# Patient Record
Sex: FEMALE | Race: WHITE | NOT HISPANIC OR LATINO | Employment: OTHER | URBAN - METROPOLITAN AREA
[De-identification: names, ages, dates, MRNs, and addresses within clinical notes are randomized per-mention and may not be internally consistent; named-entity substitution may affect disease eponyms.]

---

## 2017-01-31 ENCOUNTER — ALLSCRIPTS OFFICE VISIT (OUTPATIENT)
Dept: OTHER | Facility: OTHER | Age: 62
End: 2017-01-31

## 2017-01-31 DIAGNOSIS — M81.0 AGE-RELATED OSTEOPOROSIS WITHOUT CURRENT PATHOLOGICAL FRACTURE: ICD-10-CM

## 2017-01-31 DIAGNOSIS — Z12.31 ENCOUNTER FOR SCREENING MAMMOGRAM FOR MALIGNANT NEOPLASM OF BREAST: ICD-10-CM

## 2017-01-31 PROCEDURE — G0145 SCR C/V CYTO,THINLAYER,RESCR: HCPCS | Performed by: OBSTETRICS & GYNECOLOGY

## 2017-02-01 ENCOUNTER — LAB REQUISITION (OUTPATIENT)
Dept: LAB | Facility: HOSPITAL | Age: 62
End: 2017-02-01
Payer: COMMERCIAL

## 2017-02-01 DIAGNOSIS — Z12.4 ENCOUNTER FOR SCREENING FOR MALIGNANT NEOPLASM OF CERVIX: ICD-10-CM

## 2017-02-03 LAB
LAB AP GYN PRIMARY INTERPRETATION: NORMAL
Lab: NORMAL

## 2017-03-02 ENCOUNTER — HOSPITAL ENCOUNTER (OUTPATIENT)
Dept: MAMMOGRAPHY | Facility: MEDICAL CENTER | Age: 62
Discharge: HOME | End: 2017-03-02
Payer: COMMERCIAL

## 2017-03-02 ENCOUNTER — HOSPITAL ENCOUNTER (OUTPATIENT)
Dept: BONE DENSITY | Facility: MEDICAL CENTER | Age: 62
Discharge: HOME | End: 2017-03-02
Payer: COMMERCIAL

## 2017-03-02 DIAGNOSIS — M81.0 AGE-RELATED OSTEOPOROSIS WITHOUT CURRENT PATHOLOGICAL FRACTURE: ICD-10-CM

## 2017-03-02 DIAGNOSIS — Z12.31 ENCOUNTER FOR SCREENING MAMMOGRAM FOR MALIGNANT NEOPLASM OF BREAST: ICD-10-CM

## 2017-03-02 PROCEDURE — G0202 SCR MAMMO BI INCL CAD: HCPCS

## 2017-03-02 PROCEDURE — 77080 DXA BONE DENSITY AXIAL: CPT

## 2017-03-13 ENCOUNTER — GENERIC CONVERSION - ENCOUNTER (OUTPATIENT)
Dept: OTHER | Facility: OTHER | Age: 62
End: 2017-03-13

## 2017-07-24 ENCOUNTER — APPOINTMENT (OUTPATIENT)
Dept: PHYSICAL THERAPY | Facility: CLINIC | Age: 62
End: 2017-07-24
Payer: COMMERCIAL

## 2017-07-24 PROCEDURE — G8991 OTHER PT/OT GOAL STATUS: HCPCS

## 2017-07-24 PROCEDURE — 97162 PT EVAL MOD COMPLEX 30 MIN: CPT

## 2017-07-24 PROCEDURE — G8990 OTHER PT/OT CURRENT STATUS: HCPCS

## 2017-07-26 ENCOUNTER — APPOINTMENT (OUTPATIENT)
Dept: PHYSICAL THERAPY | Facility: CLINIC | Age: 62
End: 2017-07-26
Payer: COMMERCIAL

## 2017-07-26 PROCEDURE — 97140 MANUAL THERAPY 1/> REGIONS: CPT

## 2017-07-26 PROCEDURE — 97110 THERAPEUTIC EXERCISES: CPT

## 2017-07-31 ENCOUNTER — APPOINTMENT (OUTPATIENT)
Dept: PHYSICAL THERAPY | Facility: CLINIC | Age: 62
End: 2017-07-31
Payer: COMMERCIAL

## 2017-07-31 PROCEDURE — 97110 THERAPEUTIC EXERCISES: CPT

## 2017-07-31 PROCEDURE — 97035 APP MDLTY 1+ULTRASOUND EA 15: CPT

## 2017-08-02 ENCOUNTER — APPOINTMENT (OUTPATIENT)
Dept: PHYSICAL THERAPY | Facility: CLINIC | Age: 62
End: 2017-08-02
Payer: COMMERCIAL

## 2017-08-02 PROCEDURE — 97110 THERAPEUTIC EXERCISES: CPT

## 2017-08-02 PROCEDURE — 97140 MANUAL THERAPY 1/> REGIONS: CPT

## 2017-08-02 PROCEDURE — 97035 APP MDLTY 1+ULTRASOUND EA 15: CPT

## 2017-08-03 ENCOUNTER — APPOINTMENT (OUTPATIENT)
Dept: PHYSICAL THERAPY | Facility: CLINIC | Age: 62
End: 2017-08-03
Payer: COMMERCIAL

## 2017-08-07 ENCOUNTER — APPOINTMENT (OUTPATIENT)
Dept: PHYSICAL THERAPY | Facility: CLINIC | Age: 62
End: 2017-08-07
Payer: COMMERCIAL

## 2017-08-10 ENCOUNTER — APPOINTMENT (OUTPATIENT)
Dept: PHYSICAL THERAPY | Facility: CLINIC | Age: 62
End: 2017-08-10
Payer: COMMERCIAL

## 2017-08-16 ENCOUNTER — APPOINTMENT (OUTPATIENT)
Dept: PHYSICAL THERAPY | Facility: CLINIC | Age: 62
End: 2017-08-16
Payer: COMMERCIAL

## 2017-08-17 ENCOUNTER — APPOINTMENT (OUTPATIENT)
Dept: PHYSICAL THERAPY | Facility: CLINIC | Age: 62
End: 2017-08-17
Payer: COMMERCIAL

## 2017-08-21 ENCOUNTER — APPOINTMENT (OUTPATIENT)
Dept: PHYSICAL THERAPY | Facility: CLINIC | Age: 62
End: 2017-08-21
Payer: COMMERCIAL

## 2017-08-24 ENCOUNTER — APPOINTMENT (OUTPATIENT)
Dept: PHYSICAL THERAPY | Facility: CLINIC | Age: 62
End: 2017-08-24
Payer: COMMERCIAL

## 2017-08-28 ENCOUNTER — APPOINTMENT (OUTPATIENT)
Dept: PHYSICAL THERAPY | Facility: CLINIC | Age: 62
End: 2017-08-28
Payer: COMMERCIAL

## 2017-08-31 ENCOUNTER — APPOINTMENT (OUTPATIENT)
Dept: PHYSICAL THERAPY | Facility: CLINIC | Age: 62
End: 2017-08-31
Payer: COMMERCIAL

## 2018-01-10 NOTE — MISCELLANEOUS
Message   Recorded as Task   Date: 03/10/2017 04:41 PM, Created By: Fermin Reis   Task Name: Go to Result   Assigned To: Alina Valentin   Regarding Patient: Yadira Souza, Status: Active   Comment:    Fermin Reis - 10 Mar 2017 4:41 PM     TASK CREATED  DEXA shows improvement, continue same - Fosamax, ca, vit d and exercise        Active Problems    1  Anemia (285 9) (D64 9)   2  Anxiety (300 00) (F41 9)   3  Backache (724 5) (M54 9)   4  Cervical cancer screening (V76 2) (Z12 4)   5  Chronic pain syndrome (338 4) (G89 4)   6  Depression (311) (F32 9)   7  Disturbance Of Coordination (781 99)   8  Encounter for routine gynecological examination (V72 31) (Z01 419)   9  Encounter for screening mammogram for malignant neoplasm of breast (V76 12)   (Z12 31)   10  Fatigue (780 79) (R53 83)   11  Follow-up examination following surgery (V67 00) (Z09)   12  Increased appetite (783 6) (R63 2)   13  Limb pain (729 5) (M79 609)   14  Limb Weakness (Paresis) (728 87)   15  Osteoporosis (733 00) (M81 0)   16  Visit for routine gyn exam (V72 31) (Z01 419)    Current Meds   1  Atorvastatin Calcium 20 MG Oral Tablet; Therapy: (Recorded:31Jan2017) to Recorded   2  Cholestyramine 4 GM Oral Packet; Therapy: (Recorded:31Jan2017) to Recorded   3  Citracal TABS; Therapy: (Recorded:09Sep2013) to Recorded   4  Fluticasone Propionate 50 MCG/ACT Nasal Suspension; Therapy: (Recorded:31Jan2017) to Recorded   5  Furosemide 20 MG Oral Tablet; Therapy: (Recorded:31Jan2017) to Recorded   6  Latanoprost 0 005 % Ophthalmic Solution; Therapy: (Recorded:31Jan2017) to Recorded   7  Pantoprazole Sodium 40 MG Oral Tablet Delayed Release; Therapy: (Recorded:31Jan2017) to Recorded   8  QUEtiapine Fumarate 100 MG Oral Tablet; Therapy: (Recorded:31Jan2017) to Recorded   9  ROPINIRole HCl - 0 25 MG Oral Tablet; Therapy: (Recorded:31Jan2017) to Recorded   10   Ventolin  (90 Base) MCG/ACT Inhalation Aerosol Solution; Therapy: (Recorded:31Jan2017) to Recorded   11  Zoloft 100 MG Oral Tablet (Sertraline HCl); Therapy: (Recorded:31Jan2017) to Recorded    Allergies    1   Sulfa Drugs    Signatures   Electronically signed by : Lucita Callahan, ; Mar 13 2017  9:17AM EST                       (Author)

## 2018-01-12 VITALS
SYSTOLIC BLOOD PRESSURE: 122 MMHG | HEIGHT: 60 IN | BODY MASS INDEX: 29.86 KG/M2 | DIASTOLIC BLOOD PRESSURE: 86 MMHG | WEIGHT: 152.13 LBS

## 2018-01-17 NOTE — MISCELLANEOUS
Message     Date: 02 Dec 2016 3:37 PM EST, Recorded By: Sheliah People For: Randal Oliveira   Caller: Roxy Barnes, Self   Phone: [phone] (Home)   Reason: Other   New patient called concerned about getting a mammo slip  Advise she will be given an order when she comes in for her appointment in January  Active Problems    1  Anemia (285 9) (D64 9)   2  Anxiety (300 00) (F41 9)   3  Backache (724 5) (M54 9)   4  Chronic pain syndrome (338 4) (G89 4)   5  Depression (311) (F32 9)   6  Disturbance Of Coordination (781 99)   7  Encounter for routine gynecological examination (V72 31) (Z01 419)   8  Encounter for screening mammogram for malignant neoplasm of breast (V76 12)   (Z12 31)   9  Fatigue (780 79) (R53 83)   10  Follow-up examination following surgery (V67 00) (Z09)   11  Increased appetite (783 6) (R63 2)   12  Limb pain (729 5) (M79 609)   13  Limb Weakness (Paresis) (728 87)   14  Osteoporosis (733 00) (M81 0)   15  Routine Gynecological Exam With Cervical Pap Smear (V72 31)    Current Meds   1  Ayr Saline Nasal Neti Rinse 1 57 GM Nasal Packet; Therapy: (Recorded:61Gcg5277) to Recorded   2  Citracal TABS; Therapy: (Recorded:03Ejj4156) to Recorded   3  ClonazePAM 1 MG Oral Tablet; TAKE 1 TABLET 3 TIMES DAILY; Therapy: (Recorded:90Waw4439) to Recorded   4  Effexor  MG Oral Capsule Extended Release 24 Hour (Venlafaxine HCl ER); Therapy: (Recorded:02Yyu2143) to Recorded   5  Fexofenadine HCl - 180 MG Oral Tablet; TAKE 1 TABLET DAILY; Therapy: ([id number]) to Recorded   6  Lisinopril-Hydrochlorothiazide 10-12 5 MG Oral Tablet; Therapy: (Bryan Steinberg) to Recorded   7  Omeprazole 20 MG Oral Capsule Delayed Release; Therapy: 29Xdc8118 to (Last Priyank Layman)  Requested for: 58Ylo2488 Ordered   8  SEROquel 400 MG Oral Tablet (QUEtiapine Fumarate); Therapy: 72Eni7403 to (Last Rx:67Qnj3297)  Requested for: 43Hiw9674 Ordered   9   SEROquel XR 50 MG Oral Tablet Extended Release 24 Hour; Therapy: ([id number]) to Recorded   10  Simvastatin 40 MG Oral Tablet; Therapy: 97Tio4922 to (Last Yumi Head)  Requested for: 44Wfl3207 Ordered   11  Singulair 10 MG Oral Tablet (Montelukast Sodium); Therapy: 63ALD3175 to (Last Rx:17Aug2011)  Requested for: 17Aug2011 Ordered   12  TriLyte 420 GM Oral Solution Reconstituted; TAKE AS DIRECTED; Therapy: 04KAH4622 to (Evaluate:09Jan2016)  Requested for: 85IGG9698; Last    Rx:08Jan2016 Ordered    Allergies    1   Sulfa Drugs    Signatures   Electronically signed by : Artie Ngo, ; Dec  2 2016  3:39PM EST                       (Author)

## 2018-04-13 ENCOUNTER — ANNUAL EXAM (OUTPATIENT)
Dept: OBGYN CLINIC | Facility: CLINIC | Age: 63
End: 2018-04-13
Payer: COMMERCIAL

## 2018-04-13 VITALS
WEIGHT: 161 LBS | BODY MASS INDEX: 30.4 KG/M2 | SYSTOLIC BLOOD PRESSURE: 136 MMHG | HEIGHT: 61 IN | DIASTOLIC BLOOD PRESSURE: 84 MMHG

## 2018-04-13 DIAGNOSIS — Z01.419 ENCOUNTER FOR ANNUAL ROUTINE GYNECOLOGICAL EXAMINATION: Primary | ICD-10-CM

## 2018-04-13 DIAGNOSIS — Z12.31 ENCOUNTER FOR SCREENING MAMMOGRAM FOR BREAST CANCER: ICD-10-CM

## 2018-04-13 PROCEDURE — 99396 PREV VISIT EST AGE 40-64: CPT | Performed by: OBSTETRICS & GYNECOLOGY

## 2018-04-13 RX ORDER — LANOLIN ALCOHOL/MO/W.PET/CERES
1 CREAM (GRAM) TOPICAL 2 TIMES DAILY
COMMUNITY

## 2018-04-13 RX ORDER — MELATONIN
1000 DAILY
COMMUNITY

## 2018-04-13 RX ORDER — CLONAZEPAM 0.5 MG/1
0.5 TABLET ORAL 2 TIMES DAILY
COMMUNITY

## 2018-04-13 RX ORDER — ATORVASTATIN CALCIUM 20 MG/1
20 TABLET, FILM COATED ORAL DAILY
COMMUNITY

## 2018-04-13 RX ORDER — LISINOPRIL 10 MG/1
10 TABLET ORAL 2 TIMES DAILY
COMMUNITY

## 2018-04-13 RX ORDER — FLUTICASONE PROPIONATE 50 MCG
1 SPRAY, SUSPENSION (ML) NASAL DAILY
COMMUNITY

## 2018-04-13 RX ORDER — PNV NO.95/FERROUS FUM/FOLIC AC 28MG-0.8MG
1 TABLET ORAL DAILY
COMMUNITY

## 2018-04-13 RX ORDER — ROPINIROLE 0.25 MG/1
0.5 TABLET, FILM COATED ORAL 2 TIMES DAILY
COMMUNITY

## 2018-04-13 RX ORDER — PANTOPRAZOLE SODIUM 40 MG/1
40 TABLET, DELAYED RELEASE ORAL 2 TIMES DAILY
COMMUNITY

## 2018-04-13 RX ORDER — CHOLESTYRAMINE 4 G/9G
1 POWDER, FOR SUSPENSION ORAL
COMMUNITY

## 2018-04-13 NOTE — PROGRESS NOTES
Assessment/Plan:    No problem-specific Assessment & Plan notes found for this encounter  Diagnoses and all orders for this visit:    Encounter for annual routine gynecological examination    Encounter for screening mammogram for breast cancer    Other orders  -     Albuterol Sulfate 108 (90 Base) MCG/ACT AEPB; Inhale as needed  -     atorvastatin (LIPITOR) 20 mg tablet; Take 20 mg by mouth daily  -     calcium citrate-vitamin D (CITRACAL+D) 315-200 MG-UNIT per tablet; Take 1 tablet by mouth 2 (two) times a day  -     cholestyramine (QUESTRAN) 4 g packet; Take 1 packet by mouth 3 (three) times a day with meals  -     ciclopirox (PENLAC) 8 % solution; Apply 1 application topically daily at bedtime  -     clonazePAM (KlonoPIN) 0 5 mg tablet; Take 0 5 mg by mouth 2 (two) times a day  -     Omega-3 Fatty Acids (FISH OIL OMEGA-3) 1000 MG CAPS; Take 1 capsule by mouth daily  -     fluticasone (FLONASE) 50 mcg/act nasal spray; 1 spray into each nostril daily  -     lisinopril (ZESTRIL) 10 mg tablet; Take 10 mg by mouth 2 (two) times a day  -     cholecalciferol (VITAMIN D3) 1,000 units tablet; Take 1,000 Units by mouth daily  -     rOPINIRole (REQUIP) 0 25 mg tablet; Take 0 5 mg by mouth 2 (two) times a day  -     pantoprazole (PROTONIX) 40 mg tablet; Take 40 mg by mouth 2 (two) times a day          Subjective:      Patient ID: Krunal Lopez is a 57 y o  female      HPI    The following portions of the patient's history were reviewed and updated as appropriate: allergies, current medications, past family history, past medical history, past social history, past surgical history and problem list     Review of Systems      Objective:      /84 (BP Location: Right arm, Patient Position: Sitting, Cuff Size: Adult)   Ht 5' 1" (1 549 m)   Wt 73 kg (161 lb)   BMI 30 42 kg/m²          Physical Exam

## 2018-04-13 NOTE — PROGRESS NOTES
Assessment/Plan:    Mammogram reviewed with her    Pap up to date    Osteopenia - I reviewed her DEXA with her in detail  She is not very mobile but I reviewed walking, lift ing light weights, Ca, vit D  I urged her to do as much as she is able  No problem-specific Assessment & Plan notes found for this encounter  Diagnoses and all orders for this visit:    Encounter for annual routine gynecological examination    Encounter for screening mammogram for breast cancer    Other orders  -     Albuterol Sulfate 108 (90 Base) MCG/ACT AEPB; Inhale as needed  -     atorvastatin (LIPITOR) 20 mg tablet; Take 20 mg by mouth daily  -     calcium citrate-vitamin D (CITRACAL+D) 315-200 MG-UNIT per tablet; Take 1 tablet by mouth 2 (two) times a day  -     cholestyramine (QUESTRAN) 4 g packet; Take 1 packet by mouth 3 (three) times a day with meals  -     ciclopirox (PENLAC) 8 % solution; Apply 1 application topically daily at bedtime  -     clonazePAM (KlonoPIN) 0 5 mg tablet; Take 0 5 mg by mouth 2 (two) times a day  -     Omega-3 Fatty Acids (FISH OIL OMEGA-3) 1000 MG CAPS; Take 1 capsule by mouth daily  -     fluticasone (FLONASE) 50 mcg/act nasal spray; 1 spray into each nostril daily  -     lisinopril (ZESTRIL) 10 mg tablet; Take 10 mg by mouth 2 (two) times a day  -     cholecalciferol (VITAMIN D3) 1,000 units tablet; Take 1,000 Units by mouth daily  -     rOPINIRole (REQUIP) 0 25 mg tablet; Take 0 5 mg by mouth 2 (two) times a day  -     pantoprazole (PROTONIX) 40 mg tablet; Take 40 mg by mouth 2 (two) times a day          Subjective:      Patient ID: Nereida Gee is a 57 y o  female  Patient here for yearly, she has no gyn complaints  She has multiple medical issues  Last year she had a DEXA that showed osteopenia at all 3 sites, this was an improvement from her osteoporosis  She had been on  Fosamax but stopped it over a year ago  She is not very active    She had a normal mammogram   Normal pap last year        The following portions of the patient's history were reviewed and updated as appropriate: allergies, current medications, past family history, past medical history, past social history, past surgical history and problem list     Review of Systems   All other systems reviewed and are negative  Objective:      /84 (BP Location: Right arm, Patient Position: Sitting, Cuff Size: Adult)   Ht 5' 1" (1 549 m)   Wt 73 kg (161 lb)   BMI 30 42 kg/m²          Physical Exam   Constitutional: She appears well-developed  Neck: No tracheal deviation present  No thyromegaly present  Cardiovascular: Normal rate and regular rhythm  Pulmonary/Chest: Effort normal and breath sounds normal  Right breast exhibits no inverted nipple, no mass, no nipple discharge, no skin change and no tenderness  Left breast exhibits no inverted nipple, no mass, no nipple discharge, no skin change and no tenderness  Breasts are symmetrical    Examined seated and supine   Abdominal: Soft  She exhibits no distension and no mass  There is no tenderness  Genitourinary: Rectum normal, vagina normal and uterus normal  No labial fusion  There is no rash, tenderness, lesion or injury on the right labia  There is no rash, tenderness, lesion or injury on the left labia  Cervix exhibits no motion tenderness, no discharge and no friability  Right adnexum displays no mass, no tenderness and no fullness  Left adnexum displays no mass, no tenderness and no fullness  Vitals reviewed

## 2019-01-17 ENCOUNTER — TELEPHONE (OUTPATIENT)
Dept: OBGYN CLINIC | Facility: CLINIC | Age: 64
End: 2019-01-17

## 2019-01-17 DIAGNOSIS — Z12.31 VISIT FOR SCREENING MAMMOGRAM: Primary | ICD-10-CM

## 2019-01-31 ENCOUNTER — TELEPHONE (OUTPATIENT)
Dept: OBGYN CLINIC | Facility: CLINIC | Age: 64
End: 2019-01-31

## 2019-05-08 DIAGNOSIS — Z12.31 VISIT FOR SCREENING MAMMOGRAM: ICD-10-CM

## 2019-07-01 ENCOUNTER — TELEPHONE (OUTPATIENT)
Dept: UROLOGY | Facility: AMBULATORY SURGERY CENTER | Age: 64
End: 2019-07-01

## 2019-07-01 NOTE — TELEPHONE ENCOUNTER
Pt had left message on call center mailbox to make appt  She lives in [facility]  Was in the ER     Was in the ER and was advised to f/u with Urology  I called pt back and she just got in from her PCP  Asked to call her back because she needs to get settled and eat      Please call to make appt #[phone]

## 2020-07-09 ENCOUNTER — ANNUAL EXAM (OUTPATIENT)
Dept: OBGYN CLINIC | Facility: CLINIC | Age: 65
End: 2020-07-09
Payer: COMMERCIAL

## 2020-07-09 VITALS
BODY MASS INDEX: 30.65 KG/M2 | TEMPERATURE: 97.8 F | SYSTOLIC BLOOD PRESSURE: 130 MMHG | DIASTOLIC BLOOD PRESSURE: 78 MMHG | WEIGHT: 162.2 LBS

## 2020-07-09 DIAGNOSIS — Z01.419 ENCOUNTER FOR ANNUAL ROUTINE GYNECOLOGICAL EXAMINATION: Primary | ICD-10-CM

## 2020-07-09 DIAGNOSIS — Z78.0 ASYMPTOMATIC MENOPAUSE: ICD-10-CM

## 2020-07-09 DIAGNOSIS — Z12.31 ENCOUNTER FOR SCREENING MAMMOGRAM FOR BREAST CANCER: ICD-10-CM

## 2020-07-09 PROCEDURE — G0101 CA SCREEN;PELVIC/BREAST EXAM: HCPCS | Performed by: OBSTETRICS & GYNECOLOGY

## 2020-07-09 NOTE — PROGRESS NOTES
Assessment/Plan:    pap is up to date -reviewed guidelines    mammogram reviewed with her including breast density  RX given for next year  Discussed self breast exams    colon cancer screening - she has a hemoccult every year  She just had one a few months ago    Aydin Harris 96 ordered, discussed calcium, vitamin-D and exercise  discussed preventive care, regular exercise and a healthy diet      No problem-specific Assessment & Plan notes found for this encounter  Diagnoses and all orders for this visit:    Encounter for annual routine gynecological examination    Encounter for screening mammogram for breast cancer  -     Mammo screening bilateral w 3d & cad; Future    Asymptomatic menopause  -     DXA bone density spine hip and pelvis; Future          Subjective:      Patient ID: Rahul Dueñas is a 75 y o  female  Pt here for yearly  She has no gyn complaints  Normal Pap in 2017  Normal 3D mammogram in June of this year showed scattered fibroglandular densities  DEXA from 2017 showed osteopenia in all 3 sites which is an improvement from her osteoporosis  She was on Fosamax but is no longer on this  The following portions of the patient's history were reviewed and updated as appropriate: allergies, current medications, past family history, past medical history, past social history, past surgical history and problem list     Review of Systems   Constitutional: Negative  Gastrointestinal: Negative  Genitourinary: Negative  Objective: There were no vitals taken for this visit  Physical Exam   Constitutional: She appears well-developed  Neck: No tracheal deviation present  No thyromegaly present  Cardiovascular: Normal rate and regular rhythm  Pulmonary/Chest: Effort normal and breath sounds normal  Right breast exhibits no inverted nipple, no mass, no nipple discharge, no skin change and no tenderness   Left breast exhibits no inverted nipple, no mass, no nipple discharge, no skin change and no tenderness  Breasts are symmetrical    Examined seated and supine   Abdominal: Soft  She exhibits no distension and no mass  There is no tenderness  Genitourinary: Rectum normal, vagina normal and uterus normal  No labial fusion  There is no rash, tenderness, lesion or injury on the right labia  There is no rash, tenderness, lesion or injury on the left labia  Cervix exhibits no motion tenderness, no discharge and no friability  Right adnexum displays no mass, no tenderness and no fullness  Left adnexum displays no mass, no tenderness and no fullness  Vitals reviewed

## 2020-07-15 ENCOUNTER — HOSPITAL ENCOUNTER (OUTPATIENT)
Dept: BONE DENSITY | Facility: MEDICAL CENTER | Age: 65
Discharge: HOME | End: 2020-07-15
Payer: COMMERCIAL

## 2020-07-15 DIAGNOSIS — Z78.0 ASYMPTOMATIC MENOPAUSE: ICD-10-CM

## 2020-07-15 PROCEDURE — 77080 DXA BONE DENSITY AXIAL: CPT

## 2020-09-21 ENCOUNTER — TRANSCRIBE ORDERS (OUTPATIENT)
Dept: NEUROSURGERY | Facility: CLINIC | Age: 65
End: 2020-09-21

## 2020-09-21 DIAGNOSIS — G89.4 CHRONIC PAIN SYNDROME: Primary | ICD-10-CM

## 2022-07-15 ENCOUNTER — ANNUAL EXAM (OUTPATIENT)
Dept: OBGYN CLINIC | Facility: CLINIC | Age: 67
End: 2022-07-15
Payer: MEDICARE

## 2022-07-15 VITALS
WEIGHT: 163.6 LBS | BODY MASS INDEX: 34.34 KG/M2 | DIASTOLIC BLOOD PRESSURE: 78 MMHG | SYSTOLIC BLOOD PRESSURE: 136 MMHG | HEIGHT: 58 IN

## 2022-07-15 DIAGNOSIS — Z12.31 ENCOUNTER FOR SCREENING MAMMOGRAM FOR BREAST CANCER: ICD-10-CM

## 2022-07-15 DIAGNOSIS — M81.0 OSTEOPOROSIS OF FEMUR WITHOUT PATHOLOGICAL FRACTURE: ICD-10-CM

## 2022-07-15 DIAGNOSIS — Z01.419 ENCOUNTER FOR ANNUAL ROUTINE GYNECOLOGICAL EXAMINATION: Primary | ICD-10-CM

## 2022-07-15 PROCEDURE — G0101 CA SCREEN;PELVIC/BREAST EXAM: HCPCS | Performed by: OBSTETRICS & GYNECOLOGY

## 2022-07-15 RX ORDER — SERTRALINE HYDROCHLORIDE 100 MG/1
200 TABLET, FILM COATED ORAL EVERY MORNING
COMMUNITY
Start: 2022-07-06

## 2022-07-15 RX ORDER — LOSARTAN POTASSIUM 25 MG/1
TABLET ORAL
COMMUNITY
Start: 2022-03-09

## 2022-07-15 RX ORDER — MONTELUKAST SODIUM 10 MG/1
10 TABLET ORAL DAILY
COMMUNITY
Start: 2022-04-27

## 2022-07-15 RX ORDER — AZELASTINE HCL 205.5 UG/1
SPRAY NASAL
COMMUNITY
Start: 2022-04-04

## 2022-07-15 RX ORDER — OMEPRAZOLE 40 MG/1
40 CAPSULE, DELAYED RELEASE ORAL 2 TIMES DAILY
COMMUNITY
Start: 2022-07-11

## 2022-07-15 RX ORDER — FERROUS SULFATE 325(65) MG
TABLET ORAL
COMMUNITY
Start: 2022-06-15

## 2022-07-15 RX ORDER — QUETIAPINE FUMARATE 100 MG/1
TABLET, FILM COATED ORAL
COMMUNITY
Start: 2022-06-14

## 2022-07-15 RX ORDER — TIZANIDINE 2 MG/1
TABLET ORAL
COMMUNITY
Start: 2022-03-21

## 2022-07-15 NOTE — LETTER
July 15, 2022     Darrelyn Canavan, DO  [address] [facility]  [address]  [place] [address]    Patient: Araceli Gudino   YOB: 1955   Date of Visit: 7/15/2022       Dear Dr Allen Gottron:    I saw Kirk Karimi today for her yearly gyn exam   Below are my notes for this consultation  If you have questions, please do not hesitate to call me  I look forward to following your patient along with you           Sincerely,        Mikhail Parisi DO        CC:

## 2022-07-15 NOTE — PROGRESS NOTES
Assessment/Plan:    She does not require a pap     mammogram reviewed with her including breast density  RX given for next April    Discussed self breast exams    colon cancer screening - colonoscopy done in 2016, now she does yearly hemoccult    Osteoporosis - DEXA ordered, discussed recommendations, she is not very active because of her mobility issues  She stated that she lives alone and does not have much help     discussed preventive care, regular exercise and a healthy diet      No problem-specific Assessment & Plan notes found for this encounter  Diagnoses and all orders for this visit:    Encounter for annual routine gynecological examination    Encounter for screening mammogram for breast cancer  -     Mammo screening bilateral w 3d & cad; Future    Osteoporosis of femur without pathological fracture  -     DXA bone density spine hip and pelvis; Future    Other orders  -     Azelastine HCl 0 15 % SOLN; azelastine 205 5 mcg (0 15 %) nasal spray   instill 1 spray into each nostril twice a day  -     Diclofenac Sodium (VOLTAREN) 1 %; diclofenac 1 % topical gel   APPLY 2 GRAMS TOPICALLY 4 TIMES A DAY TO AFFECTED AREA  -     FeroSul 325 (65 Fe) MG tablet; TAKE 1 TABLET 2 TIMES A DAY WITH MEALS  -     losartan (COZAAR) 25 mg tablet; losartan 25 mg tablet   take 1 tablet by mouth once daily  -     montelukast (SINGULAIR) 10 mg tablet; Take 10 mg by mouth daily  -     omeprazole (PriLOSEC) 40 MG capsule; Take 40 mg by mouth 2 (two) times a day  -     sertraline (ZOLOFT) 100 mg tablet; Take 200 mg by mouth every morning  -     tiZANidine (ZANAFLEX) 2 mg tablet; tizanidine 2 mg tablet   TAKE 1 TABLET EVERY 8 HOURS AS NEEDED FOR MUSCLE SPASMS  -     QUEtiapine (SEROquel) 100 mg tablet; TAKE 2 TABLETS BY MOUTH NIGHTLY  TAKE DOSE AT BEDTIME ONLY  Subjective:      Patient ID: Aura Medina is a 74 y o  female  Patient here for yearly  She has no gyn complaints    She seems somewhat forgetful at times although when asked questions, she answered very appropriately and had the answers to my questions  She has issues with mobility and uses a morphine pump for chronic pain    She had a normal 3D mammogram in April with scattered fibroglandular densities  She had osteoporosis in her femoral neck on DEXA from 2020 and osteopenia at all other sites  She has not started treatment  We      The following portions of the patient's history were reviewed and updated as appropriate: allergies, current medications, past family history, past medical history, past social history, past surgical history and problem list     Review of Systems   Constitutional: Negative  Gastrointestinal: Negative  Genitourinary: Negative  Objective:      /78 (BP Location: Left arm, Patient Position: Sitting)   Ht 4' 9 75" (1 467 m)   Wt 74 2 kg (163 lb 9 6 oz)   BMI 34 49 kg/m²          Physical Exam  Vitals reviewed  Constitutional:       Appearance: She is well-developed  Neck:      Thyroid: No thyromegaly  Trachea: No tracheal deviation  Cardiovascular:      Rate and Rhythm: Normal rate and regular rhythm  Pulmonary:      Effort: Pulmonary effort is normal       Breath sounds: Normal breath sounds  Chest:   Breasts: Breasts are symmetrical       Right: No inverted nipple, mass, nipple discharge, skin change or tenderness  Left: No inverted nipple, mass, nipple discharge, skin change or tenderness  Abdominal:      General: There is no distension  Palpations: Abdomen is soft  There is no mass  Tenderness: There is no abdominal tenderness  Genitourinary:     Labia:         Right: No rash, tenderness, lesion or injury  Left: No rash, tenderness, lesion or injury  Vagina: Normal       Cervix: No cervical motion tenderness, discharge or friability  Adnexa:         Right: No mass, tenderness or fullness  Left: No mass, tenderness or fullness  Rectum: Normal

## 2022-07-15 NOTE — LETTER
July 15, 2022     Isabell Alex DO  [address] [facility]  [address]  [place] [address] [facility] [address]    Patient: Lulu Saldaña   YOB: 1955   Date of Visit: 7/15/2022       Dear Dr Josafat Agarwal:    I saw Loan Crowder today for yearly gyn exam  Below are my notes for this consultation  If you have questions, please do not hesitate to call me  I look forward to following your patient along with you           Sincerely,        Bud Cheek DO

## 2022-09-07 ENCOUNTER — TELEPHONE (OUTPATIENT)
Dept: GYNECOLOGY | Facility: CLINIC | Age: 67
End: 2022-09-07

## 2023-01-09 ENCOUNTER — HOSPITAL ENCOUNTER (OUTPATIENT)
Dept: BONE DENSITY | Facility: MEDICAL CENTER | Age: 68
Discharge: HOME | End: 2023-01-09

## 2023-01-09 DIAGNOSIS — M81.0 OSTEOPOROSIS OF FEMUR WITHOUT PATHOLOGICAL FRACTURE: ICD-10-CM

## 2023-01-18 DIAGNOSIS — M81.0 AGE RELATED OSTEOPOROSIS, UNSPECIFIED PATHOLOGICAL FRACTURE PRESENCE: Primary | ICD-10-CM

## 2023-01-26 ENCOUNTER — TELEPHONE (OUTPATIENT)
Dept: GYNECOLOGY | Facility: CLINIC | Age: 68
End: 2023-01-26

## 2023-01-26 NOTE — TELEPHONE ENCOUNTER
Patient called  She has an appointment on Friday 1/27/23 to discuss Dexa results  She received blood work orders in the mail but was confused about the papers and what it was for  She did not go for the blood work as ordered by Dr Judson Tamayo  Advised patient to keep appointment anyway

## 2023-01-27 ENCOUNTER — CONSULT (OUTPATIENT)
Dept: GYNECOLOGY | Facility: CLINIC | Age: 68
End: 2023-01-27

## 2023-01-27 VITALS
BODY MASS INDEX: 33.88 KG/M2 | SYSTOLIC BLOOD PRESSURE: 116 MMHG | HEIGHT: 58 IN | WEIGHT: 161.4 LBS | DIASTOLIC BLOOD PRESSURE: 80 MMHG

## 2023-01-27 DIAGNOSIS — M81.0 AGE RELATED OSTEOPOROSIS, UNSPECIFIED PATHOLOGICAL FRACTURE PRESENCE: Primary | ICD-10-CM

## 2023-01-27 RX ORDER — ONDANSETRON HYDROCHLORIDE 8 MG/1
TABLET, FILM COATED ORAL
COMMUNITY
Start: 2023-01-04

## 2023-01-27 NOTE — LETTER
January 27, 2023     Yoselin [facility], [address] [facility] [address] [place] [address]  [address]  [facility] [address]    Patient: Deejay Kumari   YOB: 1955   Date of Visit: 1/27/2023       Dear Dr Mary Crawford:    I saw Kevin Alaniz today to review her DEXA  She has worsening osteoporosis  We have discussed this in the past but she has not be treated  I reviewed treatment with her today in detail  I think that because of her GI issues, she would do better with an injectable medication  We do not give that in the office  We discussed referring her to endocrinology  Below are my notes for this consultation  If you have questions, please do not hesitate to call me  I look forward to following your patient along with you           Sincerely,        Rd Sprague, DO show

## 2023-01-27 NOTE — PROGRESS NOTES
Assessment/Plan:     Osteoporosis -I reviewed the DEXA with her  Her osteoporosis has worsened since her DEXA from 2020  We discussed calcium and vitamin D  We also discussed exercise  She does not get out much but we discussed muscle strengthening exercises that she could do at home  She does have resistance bands  We discussed medications  Initially, we talked about oral medication such as Fosamax however she states that she has frequent issues with reflux and nausea and has frequent vomiting if her stomach becomes upset  In light of this, would avoid an oral bisphosphonate although may be she would tolerate Atelvia  She is probably a good candidate for Prolia  We do not give this in our office but it could be given through her primary care doctor possibly or we could refer her to endocrinology  She is unsure of what she should do  I gave her information on all of the medications that we discussed and urged her to read them over  I did order an intact PTH, CMP and vitamin D level  She is going to see her internist in about 2 weeks  I will send him these visit notes  There are no diagnoses linked to this encounter  Subjective:     Patient ID: Jeremias Rios is a 69 y o  female  Patient here to discuss her DEXA results  DEXA from earlier this month showed osteoporosis in her total hip and femoral neck, normal lumbar spine  I ordered a CMP, vitamin D level and intact PTH  She had osteoporosis of the femoral neck on her DEXA from 2020  We had discussed treatment at her last visit but she has not started any medication  She has limited mobility due to back problems and chronic pain  She does take calcium and vitamin D    She has problems with reflux and nausea and vomits at times due to this  Review of Systems   Constitutional: Negative  Gastrointestinal: Negative  Genitourinary: Negative            Objective:     Physical Exam

## 2023-01-30 ENCOUNTER — TELEPHONE (OUTPATIENT)
Dept: GYNECOLOGY | Facility: CLINIC | Age: 68
End: 2023-01-30

## 2023-01-30 NOTE — TELEPHONE ENCOUNTER
Faxed notes to Dr Kamille Torres office  Called to check on Prolia  Per his nurse, Dr Spencer Barraza does not give Prolia injections in his office but will discuss options at patient's upcoming office visit

## 2023-01-30 NOTE — TELEPHONE ENCOUNTER
----- Message from Jesus Contreras DO sent at 1/27/2023  4:05 PM EST -----  Hello,    Can you please send a copy of my notes from this visit to Dr Alejandra Baer  I am not sure it will go through [facility] because he is not in the network  Also, can you see if they give Prolia there? That is probably best for her as she has multiple stomach issues and she seems unsure about going to endocrinology    If you need me to talk to them or refer her to endocrine, please let me know  thanks

## 2023-01-31 ENCOUNTER — TELEPHONE (OUTPATIENT)
Dept: GYNECOLOGY | Facility: CLINIC | Age: 68
End: 2023-01-31

## 2023-01-31 NOTE — TELEPHONE ENCOUNTER
Per Dr Carmela Aguirre, called patient to inform of blood work results  Advised discuss with Dr Severino Robledo at her upcoming appointment  Patient will also discuss her Dexa Scan result at her appointment with him  Faxed results to Dr Cruz Babinski office  Scanned blood work results into patient's Epic chart

## 2023-11-30 ENCOUNTER — TELEPHONE (OUTPATIENT)
Dept: NEUROSURGERY | Facility: CLINIC | Age: 68
End: 2023-11-30

## 2023-12-11 ENCOUNTER — CONSULT (OUTPATIENT)
Dept: NEUROSURGERY | Facility: CLINIC | Age: 68
End: 2023-12-11
Payer: MEDICARE

## 2023-12-11 VITALS
TEMPERATURE: 97.9 F | DIASTOLIC BLOOD PRESSURE: 108 MMHG | OXYGEN SATURATION: 96 % | HEIGHT: 57 IN | BODY MASS INDEX: 34.73 KG/M2 | WEIGHT: 161 LBS | SYSTOLIC BLOOD PRESSURE: 161 MMHG | HEART RATE: 93 BPM

## 2023-12-11 DIAGNOSIS — Z45.42 BATTERY END OF LIFE OF SPINAL CORD STIMULATOR: Primary | ICD-10-CM

## 2023-12-11 DIAGNOSIS — R10.2 VAGINAL PAIN: ICD-10-CM

## 2023-12-11 DIAGNOSIS — Z01.818 PRE-PROCEDURAL EXAMINATION: ICD-10-CM

## 2023-12-11 PROCEDURE — 99204 OFFICE O/P NEW MOD 45 MIN: CPT | Performed by: STUDENT IN AN ORGANIZED HEALTH CARE EDUCATION/TRAINING PROGRAM

## 2023-12-11 RX ORDER — DULOXETIN HYDROCHLORIDE 30 MG/1
30 CAPSULE, DELAYED RELEASE ORAL DAILY
COMMUNITY

## 2023-12-11 RX ORDER — MULTIVITAMIN
1 CAPSULE ORAL DAILY
COMMUNITY

## 2023-12-11 RX ORDER — TORSEMIDE 20 MG/1
20 TABLET ORAL DAILY
COMMUNITY
Start: 2023-12-06

## 2023-12-11 RX ORDER — QUETIAPINE FUMARATE 25 MG/1
25 TABLET, FILM COATED ORAL 2 TIMES DAILY
COMMUNITY
Start: 2023-12-01

## 2023-12-11 RX ORDER — ACETAMINOPHEN 325 MG/1
650 TABLET ORAL EVERY 6 HOURS PRN
COMMUNITY

## 2023-12-11 RX ORDER — OSTOMY SUPPLY
BOX MISCELLANEOUS
COMMUNITY

## 2023-12-11 RX ORDER — LIDOCAINE 4 G/G
PATCH TOPICAL
COMMUNITY

## 2023-12-11 RX ORDER — CHLORHEXIDINE GLUCONATE ORAL RINSE 1.2 MG/ML
15 SOLUTION DENTAL ONCE
OUTPATIENT
Start: 2023-12-11 | End: 2023-12-11

## 2023-12-11 RX ORDER — BUSPIRONE HYDROCHLORIDE 15 MG/1
15 TABLET ORAL 2 TIMES DAILY
COMMUNITY
Start: 2023-12-01 | End: 2023-12-11 | Stop reason: ALTCHOICE

## 2023-12-11 RX ORDER — GABAPENTIN 300 MG/1
300 CAPSULE ORAL 3 TIMES DAILY
COMMUNITY
Start: 2023-12-06

## 2023-12-11 RX ORDER — CARBOXYMETHYLCELLULOSE SODIUM 5 MG/ML
1 SOLUTION/ DROPS OPHTHALMIC 4 TIMES DAILY
COMMUNITY

## 2023-12-11 RX ORDER — ACETAMINOPHEN 500 MG
500 TABLET ORAL EVERY 6 HOURS PRN
COMMUNITY

## 2023-12-11 RX ORDER — MIRTAZAPINE 7.5 MG/1
7.5 TABLET, FILM COATED ORAL DAILY
COMMUNITY

## 2023-12-11 RX ORDER — LOSARTAN POTASSIUM 50 MG/1
50 TABLET ORAL DAILY
COMMUNITY
Start: 2023-12-03

## 2023-12-11 RX ORDER — TRAMADOL HYDROCHLORIDE 50 MG/1
50 TABLET ORAL EVERY 8 HOURS PRN
COMMUNITY
Start: 2023-12-06

## 2023-12-11 NOTE — LETTER
December 11, 2023     Yoanna Harp DO  [address]  [address]  [facility] [address]    Patient: Eric Barraza   YOB: 1955   Date of Visit: 12/11/2023       Dear Dr. Kya Chung: Thank you for referring Patrick Montano to me for evaluation. Below are my notes for this consultation. If you have questions, please do not hesitate to call me. I look forward to following your patient along with you. Sincerely,        Carlos Steward MD        CC: No Recipients    Carlos Steward MD  12/11/2023 11:39 AM  Sign when Signing Visit  Assessment/Plan:  75-year-old female with a history of posterior lumbar decompression surgery many years ago and postlaminectomy syndrome treated with a spinal cord stimulator that was placed around 2012 and a subsequent morphine intrathecal pain pump placed around 2015. She presents to clinic today for discussion of battery end-of-life care for her spinal cord stimulator. I discussed risk and benefits of left flank spinal cord stimulator battery replacement. She and her POA agreed to proceed with surgery and consent was obtained. Will need preoperative clearance prior to surgery. She also complains of a history of vaginal pain for several weeks and I ordered a UA to rule out UTI for this. I also ordered x-ray thoracic and x-ray lumbar spine for baseline films of her hardware. Subjective:      Patient ID: Eric Barraza is a 77 y.o. female. HPI    75-year-old female with a history of posterior lumbar decompression surgery many years ago and postlaminectomy syndrome treated with a spinal cord stimulator that was placed around 2012 and a subsequent morphine intrathecal pain pump placed around 2015. She presents to clinic today for discussion of battery end-of-life care for her spinal cord stimulator. She states that over the last year or so she has noticed worsening pain in her low back and left lower extremity in the lateral thigh and lateral leg.   She states that she has had good relief from her spinal cord stimulator in the past however has not had it evaluated in quite some time and is unsure when the battery has . She states that she had her pain pump evaluated approximately 2 to 3 months ago and it was in normal working order.     The following portions of the patient's history were reviewed and updated as appropriate: allergies, current medications, past family history, past medical history, past social history, past surgical history, and problem list.    Review of Systems      Objective:      BP (!) 161/108 (BP Location: Left arm, Patient Position: Sitting, Cuff Size: Standard)   Pulse 93   Temp 97.9 °F (36.6 °C) (Temporal)   Ht 4' 9" (1.448 m)   Wt 73 kg (161 lb)   SpO2 96%   BMI 34.84 kg/m²          Physical Exam    A&OX3  Gaze conjugate  EOMI  FS  TM  Fcx4  5/5 BUE  5/5 BLE  SILT

## 2023-12-11 NOTE — PROGRESS NOTES
Assessment/Plan:  22-year-old female with a history of posterior lumbar decompression surgery many years ago and postlaminectomy syndrome treated with a spinal cord stimulator that was placed around  and a subsequent morphine intrathecal pain pump placed around . She presents to clinic today for discussion of battery end-of-life care for her spinal cord stimulator. She states that she has had good relief of her low back pain and leg pain with her spinal cord stimulator in the past.  I discussed risk and benefits of left flank spinal cord stimulator battery replacement. She and her POA agreed to proceed with surgery and consent was obtained. Will need preoperative clearance prior to surgery. She also complains of a history of vaginal pain for several weeks and I ordered a UA to rule out UTI for this. I also ordered x-ray thoracic and x-ray lumbar spine for baseline films of her hardware. Subjective:      Patient ID: Sherie Zaidi is a 77 y.o. female. HPI    22-year-old female with a history of posterior lumbar decompression surgery many years ago and postlaminectomy syndrome treated with a spinal cord stimulator that was placed around  and a subsequent morphine intrathecal pain pump placed around . She presents to clinic today for discussion of battery end-of-life care for her spinal cord stimulator. She states that over the last year or so she has noticed worsening pain in her low back and left lower extremity in the lateral thigh and lateral leg. She states that she has had good relief from her spinal cord stimulator in the past however has not had it evaluated in quite some time and is unsure when the battery has . She states that she had her pain pump evaluated approximately 2 to 3 months ago and it was in normal working order.     The following portions of the patient's history were reviewed and updated as appropriate: allergies, current medications, past family history, past medical history, past social history, past surgical history, and problem list.    Review of Systems      Objective:      BP (!) 161/108 (BP Location: Left arm, Patient Position: Sitting, Cuff Size: Standard)   Pulse 93   Temp 97.9 °F (36.6 °C) (Temporal)   Ht 4' 9" (1.448 m)   Wt 73 kg (161 lb)   SpO2 96%   BMI 34.84 kg/m²          Physical Exam    A&OX3  Gaze conjugate  EOMI  FS  TM  Fcx4  5/5 BUE  5/5 BLE  SILT

## 2023-12-12 ENCOUNTER — TELEPHONE (OUTPATIENT)
Dept: GYNECOLOGY | Facility: CLINIC | Age: 68
End: 2023-12-12

## 2023-12-12 NOTE — TELEPHONE ENCOUNTER
[facility] called to schedule appointment for patient. Her last Yearly exam was on 7/15/22. No answer to return call. No voicemail.

## 2023-12-27 ENCOUNTER — TELEPHONE (OUTPATIENT)
Dept: NEUROSURGERY | Facility: CLINIC | Age: 68
End: 2023-12-27

## 2023-12-27 ENCOUNTER — HOSPITAL ENCOUNTER (OUTPATIENT)
Dept: RADIOLOGY | Facility: HOSPITAL | Age: 68
Discharge: HOME | End: 2023-12-27
Payer: MEDICARE

## 2023-12-27 DIAGNOSIS — Z45.42 BATTERY END OF LIFE OF SPINAL CORD STIMULATOR: ICD-10-CM

## 2023-12-27 PROCEDURE — 72070 X-RAY EXAM THORAC SPINE 2VWS: CPT

## 2023-12-27 PROCEDURE — 72100 X-RAY EXAM L-S SPINE 2/3 VWS: CPT

## 2023-12-27 NOTE — TELEPHONE ENCOUNTER
Spoke with Lu of [facility] @ 3:39 pm, stated will have patient labs drawn and will fax results by Friday, 12/29.

## 2023-12-28 ENCOUNTER — TELEPHONE (OUTPATIENT)
Dept: NEUROSURGERY | Facility: CLINIC | Age: 68
End: 2023-12-28

## 2023-12-28 RX ORDER — BISACODYL 10 MG
10 SUPPOSITORY, RECTAL RECTAL AS NEEDED
COMMUNITY

## 2023-12-28 RX ORDER — BUSPIRONE HYDROCHLORIDE 15 MG/1
15 TABLET ORAL 2 TIMES DAILY
COMMUNITY

## 2023-12-28 RX ORDER — CIPROFLOXACIN 250 MG/1
250 TABLET, FILM COATED ORAL EVERY 12 HOURS SCHEDULED
COMMUNITY

## 2023-12-28 RX ORDER — MULTIVIT WITH MINERALS/LUTEIN
1000 TABLET ORAL DAILY
COMMUNITY

## 2023-12-28 NOTE — TELEPHONE ENCOUNTER
Called and left message with Joceline, nurse manager for [facility] after calling and my call was transferred and I was then either hung up on or the call was dropped.     I was calling to confirm the patients labs were drawn today and the EKG was completed, as I was advised by Su, the nurse I spoke with yesterday. She advised me they would be done today since this was still pending and was to be done on Wednesday, 12/27 at the time she completed her X-Rays at [facility] and for some reason they were completed.     I spoke with Lu on 12/27 at 3:39 pm as well as Su to confirm the exact labs needed and the EKG needed and confirmed surgery date of 1/2/24.

## 2023-12-28 NOTE — PRE-PROCEDURE INSTRUCTIONS
Pre-Surgery Instructions:   Medication Instructions    acetaminophen (TYLENOL) 325 mg tablet Uses PRN- OK to take day of surgery    acetaminophen (TYLENOL) 500 mg tablet Uses PRN- OK to take day of surgery    Ascorbic Acid (vitamin C) 1000 MG tablet Hold day of surgery.    bisacodyl (Dulcolax) 10 mg suppository Uses PRN- DO NOT take day of surgery    bisacodyl (FLEET) 10 MG/30ML ENEM Uses PRN- DO NOT take day of surgery    busPIRone (BUSPAR) 15 mg tablet Take day of surgery.    carboxymethylcellulose (REFRESH PLUS) 0.5 % SOLN Take day of surgery.    cholecalciferol (VITAMIN D3) 1,000 units tablet Hold day of surgery.    ciprofloxacin (CIPRO) 250 mg tablet Hold day of surgery. Only the am dose    clonazePAM (KlonoPIN) 0.5 mg tablet Take day of surgery     cyanocobalamin (VITAMIN B-12) 500 MCG tablet Hold day of surgery.    Diclofenac Sodium (VOLTAREN) 1 % Stop taking 3 days prior to surgery.    DULoxetine (CYMBALTA) 30 mg delayed release capsule Take day of surgery.    gabapentin (NEURONTIN) 300 mg capsule Take day of surgery.    latanoprost (XALATAN) 0.005 % ophthalmic solution Take night before surgery    losartan (COZAAR) 50 mg tablet Hold day of surgery. ( If taken in pm ok to take the night before surgery    magnesium hydroxide (MILK OF MAGNESIA) 400 mg/5 mL oral suspension Uses PRN- DO NOT take day of surgery    mirtazapine (REMERON) 7.5 MG tablet Take night before surgery    Multiple Vitamin (multivitamin) capsule Hold day of surgery.    Ostomy Supplies (Skin Prep Wipes) [facility] [place] Please send extra supplies with pt the Day of surgery    QUEtiapine (SEROquel) 25 mg tablet Take day of surgery.    torsemide (DEMADEX) 20 mg tablet Hold day of surgery.    traMADol (ULTRAM) 50 mg tablet Uses PRN- OK to take day of surgery   Medication instructions for day surgery reviewed. Please use only a sip of water to take your instructed medications. Avoid all over the counter vitamins, supplements and NSAIDS for one week prior to  surgery per anesthesia guidelines. Tylenol is ok to take as needed.     You will receive a call one business day prior to surgery with an arrival time and hospital directions. If your surgery is scheduled on a Monday, the hospital will be calling you on the Friday prior to your surgery. If you have not heard from anyone by 8pm, please call the hospital supervisor through the hospital  at [phone]. ([place] [phone]).    Do not eat or drink anything after midnight the night before your surgery, including candy, mints, lifesavers, or chewing gum. Do not drink alcohol 24hrs before your surgery. Try not to smoke at least 24hrs before your surgery.       Follow the pre surgery showering instructions as listed in the “My Surgical Experience Booklet” or otherwise provided by your surgeon's office. Do not use a blade to shave the surgical area 1 week before surgery. It is okay to use a clean electric clippers up to 24 hours before surgery. Do not apply any lotions, creams, including makeup, cologne, deodorant, or perfumes after showering on the day of your surgery. Do not use dry shampoo, hair spray, hair gel, or any type of hair products.     No contact lenses, eye make-up, or artificial eyelashes. Remove nail polish, including gel polish, and any artificial, gel, or acrylic nails if possible. Remove all jewelry including rings and body piercing jewelry.     Wear causal clothing that is easy to take on and off. Consider your type of surgery.    Keep any valuables, jewelry, piercings at home. Please bring any specially ordered equipment (sling, braces) if indicated.    Arrange for a responsible person to drive you to and from the hospital on the day of your surgery. Visitor Guidelines discussed.     Call the surgeon's office with any new illnesses, exposures, or additional questions prior to surgery.    Please reference your “My Surgical Experience Booklet” for additional information to prepare for your  upcoming surgery.    Male

## 2023-12-29 ENCOUNTER — ANESTHESIA EVENT (OUTPATIENT)
Dept: PERIOP | Facility: HOSPITAL | Age: 68
End: 2023-12-29
Payer: MEDICARE

## 2024-01-02 ENCOUNTER — ANESTHESIA (OUTPATIENT)
Dept: PERIOP | Facility: HOSPITAL | Age: 69
End: 2024-01-02
Payer: MEDICARE

## 2024-01-02 ENCOUNTER — HOSPITAL ENCOUNTER (OUTPATIENT)
Facility: HOSPITAL | Age: 69
Setting detail: OUTPATIENT SURGERY
Discharge: HOME | End: 2024-01-02
Attending: STUDENT IN AN ORGANIZED HEALTH CARE EDUCATION/TRAINING PROGRAM | Admitting: STUDENT IN AN ORGANIZED HEALTH CARE EDUCATION/TRAINING PROGRAM
Payer: MEDICARE

## 2024-01-02 VITALS
HEIGHT: 61 IN | RESPIRATION RATE: 27 BRPM | TEMPERATURE: 98.1 F | SYSTOLIC BLOOD PRESSURE: 177 MMHG | DIASTOLIC BLOOD PRESSURE: 79 MMHG | HEART RATE: 90 BPM | OXYGEN SATURATION: 96 % | WEIGHT: 154.32 LBS | BODY MASS INDEX: 29.14 KG/M2

## 2024-01-02 DIAGNOSIS — Z98.890 POSTOPERATIVE STATE: Primary | ICD-10-CM

## 2024-01-02 LAB — GLUCOSE SERPL-MCNC: 103 MG/DL (ref 65–140)

## 2024-01-02 PROCEDURE — 82948 REAGENT STRIP/BLOOD GLUCOSE: CPT

## 2024-01-02 PROCEDURE — 63685 INS/RPLC SPI NPG/RCVR POCKET: CPT | Performed by: STUDENT IN AN ORGANIZED HEALTH CARE EDUCATION/TRAINING PROGRAM

## 2024-01-02 PROCEDURE — C1820 GENERATOR NEURO RECHG BAT SY: HCPCS | Performed by: STUDENT IN AN ORGANIZED HEALTH CARE EDUCATION/TRAINING PROGRAM

## 2024-01-02 DEVICE — IMPLANTABLE PULSE GENERATOR KIT
Type: IMPLANTABLE DEVICE | Site: SACRUM | Status: FUNCTIONAL
Brand: WAVEWRITER ALPHA™ PRIME

## 2024-01-02 RX ORDER — FENTANYL CITRATE/PF 50 MCG/ML
25 SYRINGE (ML) INJECTION
Status: ACTIVE | OUTPATIENT
Start: 2024-01-02 | End: 2024-01-02

## 2024-01-02 RX ORDER — BUPIVACAINE HYDROCHLORIDE 5 MG/ML
INJECTION, SOLUTION EPIDURAL; INTRACAUDAL AS NEEDED
Status: DISCONTINUED | OUTPATIENT
Start: 2024-01-02 | End: 2024-01-02 | Stop reason: HOSPADM

## 2024-01-02 RX ORDER — SUCCINYLCHOLINE/SOD CL,ISO/PF 100 MG/5ML
SYRINGE (ML) INTRAVENOUS AS NEEDED
Status: DISCONTINUED | OUTPATIENT
Start: 2024-01-02 | End: 2024-01-02

## 2024-01-02 RX ORDER — ALBUTEROL SULFATE 2.5 MG/3ML
2.5 SOLUTION RESPIRATORY (INHALATION) ONCE AS NEEDED
Status: DISCONTINUED | OUTPATIENT
Start: 2024-01-02 | End: 2024-01-02 | Stop reason: HOSPADM

## 2024-01-02 RX ORDER — VANCOMYCIN HYDROCHLORIDE 1 G/20ML
INJECTION, POWDER, LYOPHILIZED, FOR SOLUTION INTRAVENOUS AS NEEDED
Status: DISCONTINUED | OUTPATIENT
Start: 2024-01-02 | End: 2024-01-02 | Stop reason: HOSPADM

## 2024-01-02 RX ORDER — PHENYLEPHRINE HYDROCHLORIDE 10 MG/ML
INJECTION INTRAVENOUS AS NEEDED
Status: DISCONTINUED | OUTPATIENT
Start: 2024-01-02 | End: 2024-01-02

## 2024-01-02 RX ORDER — CHLORHEXIDINE GLUCONATE ORAL RINSE 1.2 MG/ML
15 SOLUTION DENTAL ONCE
Status: DISCONTINUED | OUTPATIENT
Start: 2024-01-02 | End: 2024-01-02 | Stop reason: HOSPADM

## 2024-01-02 RX ORDER — HYDROMORPHONE HCL IN WATER/PF 6 MG/30 ML
0.2 PATIENT CONTROLLED ANALGESIA SYRINGE INTRAVENOUS
Status: ACTIVE | OUTPATIENT
Start: 2024-01-02 | End: 2024-01-02

## 2024-01-02 RX ORDER — CEFAZOLIN SODIUM 2 G/50ML
SOLUTION INTRAVENOUS AS NEEDED
Status: DISCONTINUED | OUTPATIENT
Start: 2024-01-02 | End: 2024-01-02

## 2024-01-02 RX ORDER — SODIUM CHLORIDE, SODIUM LACTATE, POTASSIUM CHLORIDE, CALCIUM CHLORIDE 600; 310; 30; 20 MG/100ML; MG/100ML; MG/100ML; MG/100ML
125 INJECTION, SOLUTION INTRAVENOUS CONTINUOUS
Status: DISCONTINUED | OUTPATIENT
Start: 2024-01-02 | End: 2024-01-02 | Stop reason: HOSPADM

## 2024-01-02 RX ORDER — METOCLOPRAMIDE HYDROCHLORIDE 5 MG/ML
10 INJECTION INTRAMUSCULAR; INTRAVENOUS ONCE AS NEEDED
Status: DISCONTINUED | OUTPATIENT
Start: 2024-01-02 | End: 2024-01-02 | Stop reason: HOSPADM

## 2024-01-02 RX ORDER — ONDANSETRON 2 MG/ML
INJECTION INTRAMUSCULAR; INTRAVENOUS AS NEEDED
Status: DISCONTINUED | OUTPATIENT
Start: 2024-01-02 | End: 2024-01-02

## 2024-01-02 RX ORDER — CEPHALEXIN 500 MG/1
500 CAPSULE ORAL EVERY 6 HOURS SCHEDULED
Qty: 28 CAPSULE | Refills: 0 | Status: SHIPPED | OUTPATIENT
Start: 2024-01-02 | End: 2024-01-09

## 2024-01-02 RX ORDER — FENTANYL CITRATE 50 UG/ML
INJECTION, SOLUTION INTRAMUSCULAR; INTRAVENOUS AS NEEDED
Status: DISCONTINUED | OUTPATIENT
Start: 2024-01-02 | End: 2024-01-02

## 2024-01-02 RX ORDER — ONDANSETRON 2 MG/ML
4 INJECTION INTRAMUSCULAR; INTRAVENOUS ONCE AS NEEDED
Status: DISCONTINUED | OUTPATIENT
Start: 2024-01-02 | End: 2024-01-02 | Stop reason: HOSPADM

## 2024-01-02 RX ORDER — LIDOCAINE HYDROCHLORIDE 10 MG/ML
INJECTION, SOLUTION EPIDURAL; INFILTRATION; INTRACAUDAL; PERINEURAL AS NEEDED
Status: DISCONTINUED | OUTPATIENT
Start: 2024-01-02 | End: 2024-01-02

## 2024-01-02 RX ORDER — OXYCODONE HYDROCHLORIDE AND ACETAMINOPHEN 5; 325 MG/1; MG/1
1 TABLET ORAL EVERY 4 HOURS PRN
Qty: 20 TABLET | Refills: 0 | Status: SHIPPED | OUTPATIENT
Start: 2024-01-02 | End: 2024-01-09

## 2024-01-02 RX ORDER — PROPOFOL 10 MG/ML
INJECTION, EMULSION INTRAVENOUS AS NEEDED
Status: DISCONTINUED | OUTPATIENT
Start: 2024-01-02 | End: 2024-01-02

## 2024-01-02 RX ORDER — LIDOCAINE HYDROCHLORIDE 10 MG/ML
0.5 INJECTION, SOLUTION EPIDURAL; INFILTRATION; INTRACAUDAL; PERINEURAL ONCE AS NEEDED
Status: DISCONTINUED | OUTPATIENT
Start: 2024-01-02 | End: 2024-01-02 | Stop reason: HOSPADM

## 2024-01-02 RX ADMIN — Medication 1 MG: at 14:52

## 2024-01-02 RX ADMIN — LIDOCAINE HYDROCHLORIDE 50 MG: 10 INJECTION, SOLUTION EPIDURAL; INFILTRATION; INTRACAUDAL; PERINEURAL at 14:52

## 2024-01-02 RX ADMIN — ONDANSETRON 4 MG: 2 INJECTION INTRAMUSCULAR; INTRAVENOUS at 15:13

## 2024-01-02 RX ADMIN — Medication 100 MG: at 14:53

## 2024-01-02 RX ADMIN — PHENYLEPHRINE HYDROCHLORIDE 100 MCG: 10 INJECTION INTRAVENOUS at 15:31

## 2024-01-02 RX ADMIN — PROPOFOL 100 MG: 10 INJECTION, EMULSION INTRAVENOUS at 14:52

## 2024-01-02 RX ADMIN — FENTANYL CITRATE 50 MCG: 50 INJECTION, SOLUTION INTRAMUSCULAR; INTRAVENOUS at 14:46

## 2024-01-02 RX ADMIN — DEXMEDETOMIDINE 4 MCG: 100 INJECTION, SOLUTION, CONCENTRATE INTRAVENOUS at 14:47

## 2024-01-02 RX ADMIN — CEFAZOLIN SODIUM 2000 MG: 2 SOLUTION INTRAVENOUS at 15:02

## 2024-01-02 RX ADMIN — PHENYLEPHRINE HYDROCHLORIDE 100 MCG: 10 INJECTION INTRAVENOUS at 15:14

## 2024-01-02 RX ADMIN — FENTANYL CITRATE 50 MCG: 50 INJECTION, SOLUTION INTRAMUSCULAR; INTRAVENOUS at 15:01

## 2024-01-02 RX ADMIN — PHENYLEPHRINE HYDROCHLORIDE 100 MCG: 10 INJECTION INTRAVENOUS at 15:23

## 2024-01-02 RX ADMIN — SODIUM CHLORIDE, SODIUM LACTATE, POTASSIUM CHLORIDE, AND CALCIUM CHLORIDE: .6; .31; .03; .02 INJECTION, SOLUTION INTRAVENOUS at 14:40

## 2024-01-02 NOTE — DISCHARGE INSTR - AVS FIRST PAGE
Spine Day Surgery Discharge Instructions    Activity:    1. Do not lift more than 20 pounds for 2 weeks.    Surgical incision care:    1. Keep dressing in place for 3 days.  2. Keep incision dry for 3 days.  3. May allow clean water to flow over incision after 3 days.  4. Do not immerse the incision in water for 4 weeks.  5. After 3 days, incision may be left open to air, but should remain clean.  6. Do not apply any creams or ointments to the incision, unless otherwise instructed by [facility].  7. Contact office if increasing redness, drainage, pain or swelling around the incision.    Postoperative medication:    1. [facility] will provide pain medication for the first 2 weeks after surgery as coordinated with your pain specialist.   2. If [facility] is providing pain medication, please contact office for questions regarding dosage and modifications.  3. If [facility] is not providing pain medication postoperatively, then contact pain specialist for additional instructions and prescriptions.  4. Resume antiplatelet/anticoagulation medication 2 days after surgery, unless you notice new neurological symptoms or bleeding from incision.  5. Do not operate heavy machinery or vehicles while taking sedating medications.

## 2024-01-02 NOTE — ANESTHESIA PREPROCEDURE EVALUATION
Procedure:  REPLACEMENT IMPLANTABLE PULSE GENERATOR DORSAL SPINAL COLUMN STIMULATOR (Left: Flank)    Relevant Problems   CARDIO   (+) Essential hypertension      GI/HEPATIC   (+) Gastroesophageal reflux disease without esophagitis      Anxiety/depression  Chronic pain  CKD  HLD    Glaucoma  Schizoaffective disorder    Physical Exam    Airway    Mallampati score: II  TM Distance: <3 FB       Dental   Comment: None loose, No notable dental hx     Cardiovascular      Pulmonary      Other Findings  post-pubertal.      Anesthesia Plan  ASA Score- 3     Anesthesia Type- general with ASA Monitors.         Additional Monitors:     Airway Plan: ETT.    Comment: NPO after MN    Plan for TIVA.       Plan Factors-Exercise tolerance (METS): <4 METS.    Chart reviewed.    Patient summary reviewed.    Patient is not a current smoker.              Induction- intravenous.    Postoperative Plan- Plan for postoperative opioid use.     Informed Consent- Anesthetic plan and risks discussed with patient.  I personally reviewed this patient with the CRNA. Discussed and agreed on the Anesthesia Plan with the CRNA..

## 2024-01-02 NOTE — ANESTHESIA POSTPROCEDURE EVALUATION
Post-Op Assessment Note    CV Status:  Stable  Pain Score: 0    Pain management: adequate       Mental Status:  Alert and awake   Hydration Status:  Euvolemic   PONV Controlled:  Controlled   Airway Patency:  Patent     Post Op Vitals Reviewed: Yes      Staff: CRNA               /67 (01/02/24 1547)    Temp   98.5   Pulse 90 (01/02/24 1547)   Resp   15   SpO2 100% 3 lpm nc

## 2024-01-02 NOTE — OP NOTE
OPERATIVE REPORT  PATIENT NAME: Lucía Khan    :  1955  MRN: [id number]  Pt Location: [facility] OR ROOM 02    SURGERY DATE: 2024    Surgeons and Role:     * Surya Roper MD - Primary    Preop Diagnosis:  Battery end of life of spinal cord stimulator [Z45.42]    Post-Op Diagnosis Codes:     * Battery end of life of spinal cord stimulator [Z45.42]    Procedure(s):  Left flank spinal cord stimulator battery replacement    Specimen(s):  * No specimens in log *    Estimated Blood Loss:   Minimal    Drains:  * No LDAs found *    Anesthesia Type:   General    Operative Indications:  Battery end of life of spinal cord stimulator [Z45.42]      Complications:   None    Procedure and Technique:  68-year-old female with a history of low back pain and leg pain treated with spinal cord stimulator who presented to clinic for battery end-of-life.  Discussed risks and benefits of left flank battery replacement, answered all questions, her and her guardian agreed to proceed and consent was obtained.    Patient was brought back to main operating room general endotracheal anesthesia was induced.  Appropriate lines were placed.  Preoperative antibiotics given were Ancef.  She is placed prone on the operating room table and all pressure points were appropriately padded.  Left flank incision was prepped and draped in usual sterile fashion timeout was performed.  Incision was opened using 10 blade scalpel dissection was carried down to the pseudocapsule using monopolar electrocautery.  The pseudocapsule was divided using monopolar electrocautery.  The battery was removed and disconnected from the old leads using empty screwdriver.  The wound was irrigated with large amount sterile saline.  The new battery was brought into the field and connected to the old leads using empty screwdriver.  The battery and leads were verified to be working correctly by the representative and the new battery was placed into the old pocket.  It was  secured down using a 2-0 silk stitch.  The pocket was filled with vancomycin powder and the pseudocapsule was closed with inverted 2-0 Vicryl sutures.  The dermis was closed with inverted 2-0 Vicryl sutures.  Skin was closed with a running 4 oh strata fix.  After procedure was done a counts and all sponge instrument needle counts verified to be correct.  Patient was transported to PACU in stable condition.     I was present for the entire procedure.    Patient Disposition:  PACU         SIGNATURE: Surya Roper MD  DATE: January 2, 2024  TIME: 3:44 PM

## 2024-01-05 ENCOUNTER — TELEPHONE (OUTPATIENT)
Dept: NEUROSURGERY | Facility: CLINIC | Age: 69
End: 2024-01-05

## 2024-01-05 ENCOUNTER — OFFICE VISIT (OUTPATIENT)
Dept: GYNECOLOGY | Facility: CLINIC | Age: 69
End: 2024-01-05
Payer: MEDICARE

## 2024-01-05 VITALS — SYSTOLIC BLOOD PRESSURE: 130 MMHG | BODY MASS INDEX: 29.4 KG/M2 | WEIGHT: 155.6 LBS | DIASTOLIC BLOOD PRESSURE: 76 MMHG

## 2024-01-05 DIAGNOSIS — N95.2 VAGINAL ATROPHY: Primary | ICD-10-CM

## 2024-01-05 PROCEDURE — 99213 OFFICE O/P EST LOW 20 MIN: CPT | Performed by: OBSTETRICS & GYNECOLOGY

## 2024-01-05 RX ORDER — TORSEMIDE 10 MG/1
10 TABLET ORAL
COMMUNITY
Start: 2023-01-31 | End: 2024-01-31

## 2024-01-05 RX ORDER — ESTRADIOL 0.1 MG/G
1 CREAM VAGINAL 2 TIMES WEEKLY
Qty: 42.5 G | Refills: 1 | Status: SHIPPED | OUTPATIENT
Start: 2024-01-08

## 2024-01-05 RX ORDER — FUROSEMIDE 20 MG/1
TABLET ORAL
COMMUNITY
Start: 2023-11-09

## 2024-01-05 RX ORDER — AMLODIPINE BESYLATE 5 MG/1
5 TABLET ORAL DAILY
COMMUNITY
Start: 2023-07-16 | End: 2024-07-15

## 2024-01-05 NOTE — PROGRESS NOTES
Assessment/Plan:     Vaginal atrophy - discussed this with the patient and her guardian.  I believe this is causing her symptoms.  Other than atrophy, her exam is normal.  I reviewed moisturizer and lubricant.  I think she would do well with vaginal estrogen.  Prescription sent for this.  Instructions and side effects reviewed  Return in 3 months for a recheck.     Diagnoses and all orders for this visit:    Encounter for annual routine gynecological examination    Other orders  -     acetaminophen (TYLENOL) 325 mg suppository; Insert 325 mg into the rectum every 4 (four) hours as needed  -     amLODIPine (NORVASC) 5 mg tablet; Take 5 mg by mouth daily  -     furosemide (LASIX) 20 mg tablet  -     torsemide (DEMADEX) 10 mg tablet; Take 10 mg by mouth          Subjective:     Patient ID: Lucía Khan is a 68 y.o. female.    Pt here for evaluation of vaginal discomfort.  This started shortly after she stopped wearing incontinence underwear.  She had to wear this for 2 months.  She was treated for a UTI.  She denies actual pain but has an uncomfortable vaginal feeling, sometimes she feels like there is air in the vagina.  She denies any bleeding, itching or dysuria.  It is more of an uncomfortable subluxation.  She moved into [facility] and is not happy there, although she seems much better than at prior visits.    She denies any pelvic surgery to her bowel or bladder.  She has not had a hysterectomy.            Review of Systems   Genitourinary:  Positive for vaginal pain. Negative for dysuria, vaginal bleeding and vaginal discharge.        Patient having vaginal discomfort, denies actual pain.  Feels air in vagina         Objective:     Physical Exam  Genitourinary:     General: Normal vulva.      Vagina: Normal.      Cervix: Normal.      Uterus: Normal.       Adnexa: Right adnexa normal and left adnexa normal.      Comments: Vaginal atrophy noted, tiny amount of yellow discharge consistent with atrophic  changes

## 2024-01-05 NOTE — TELEPHONE ENCOUNTER
1st attempt - Called patient on primary contact number after surgery & recent discharge from the hospital to check in on recovery and provide post surgical instructions. No  voicemail set up. Will make another attempt at later time.

## 2024-01-05 NOTE — PROGRESS NOTES
Assessment/Plan:    pap is up to date    mammogram reviewed with her including breast density.  RX given     Discussed self breast exams    colon cancer screening    discussed preventive care, regular exercise and a healthy diet      No problem-specific Assessment & Plan notes found for this encounter.       {Assess/PlanSmartLinks:95468}      Subjective:      Patient ID: Lucía Khan is a 68 y.o. female.    Pt here for yearly.        DEXA from 1-2023showed osteoporosis in the hip and femoral neck. We thoroughly reviewed treatment options.  I recommended Prolia because she has stomach concerns.        {Common ambulatory SmartLinks:28519}    Review of Systems      Objective:      There were no vitals taken for this visit.         Physical Exam

## 2024-01-16 ENCOUNTER — TELEPHONE (OUTPATIENT)
Dept: NEUROSURGERY | Facility: CLINIC | Age: 69
End: 2024-01-16

## 2024-01-22 ENCOUNTER — CLINICAL SUPPORT (OUTPATIENT)
Dept: NEUROSURGERY | Facility: CLINIC | Age: 69
End: 2024-01-22

## 2024-01-22 VITALS — DIASTOLIC BLOOD PRESSURE: 78 MMHG | TEMPERATURE: 97.6 F | SYSTOLIC BLOOD PRESSURE: 120 MMHG

## 2024-01-22 DIAGNOSIS — G89.18 POST-OP PAIN: Primary | ICD-10-CM

## 2024-01-22 PROCEDURE — 99024 POSTOP FOLLOW-UP VISIT: CPT | Performed by: STUDENT IN AN ORGANIZED HEALTH CARE EDUCATION/TRAINING PROGRAM

## 2024-01-22 RX ORDER — MECLIZINE HCL 12.5 MG/1
12.5 TABLET ORAL 2 TIMES DAILY
COMMUNITY

## 2024-01-22 RX ORDER — SACCHAROMYCES BOULARDII 250 MG
500 CAPSULE ORAL DAILY
COMMUNITY

## 2024-01-22 RX ORDER — LANOLIN ALCOHOL/MO/W.PET/CERES
3 CREAM (GRAM) TOPICAL
COMMUNITY

## 2024-01-22 NOTE — PROGRESS NOTES
Post-Op Visit- Neurosurgery    Lucía Khan 68 y.o. female MRN: [id number]    Chief Complaint:  Patient presents post: Replacement Implantable Pulse Generator Dorsal Spinal Column Stimulator - Left    History of Present Illness:  Patient presents for 2 week POV for incision check. Arrived unaccompanied and ambulated well with rolling walker. She reports her pain is 0/10 and she is very happy with the results of the surgery so far.     Assessment:   Vitals:    01/22/24 1111   BP: 120/78   Temp: 97.6 °F (36.4 °C)       Wound Exam: Incision is clean, dry, and in tact, well approximated, without edema, erythema, or drainage. See below:         Procedure:  Surgical site assessment.   Complications: None.       Discussion/Summary:  Doing well postoperatively. Reviewed incision care with patient including daily observation for s/s infection including: increased erythema, edema, drainage, dehiscence of incision or fever >101.  Should these be observed, she understands that she is to call and/or return immediately for reassessment.  Advised patient to continue cleansing area with mild soap and water and pat dry. Not to apply any lotions, creams, or ointments, & not to submerge in any water for 4 more weeks.     She is to maintain activity restrictions for about 4 more weeks. Activity levels were also reviewed with the patient in detail, she is to lift no greater than 10 pounds and ambulation is encouraged as tolerated.     Explained that future follow-up will be on an as needed basis. She is to call the office with any further questions or concerns, or if any incisional issues or fevers would arise.     Contacted [employer] as she was not present to assist patient with programming. Left a message for Radha. Patient states she does not want to change any settings presently. Encouraged her to contact the [employer] for further  assistance with this in the future.

## 2024-01-22 NOTE — PATIENT INSTRUCTIONS
1. Wash incision gently in the shower with mild soap and water. Avoid submerging in tub, hot tub, or pool.  2. Leave incision open to air when ever possible, may cover loosely with gauze and paper tape for comfort. Do not seal incision under bandages.    3. Do not apply any creams, ointments, or lotions directly to incision for 4 more weeks.  4. Ambulate as tolerated.   5. Future follow-up will be on an as needed basis.   6. Contact the office with any questions or concerns or the [employer] for programming questions. Radha - [phone].

## 2024-02-22 ENCOUNTER — TELEPHONE (OUTPATIENT)
Age: 69
End: 2024-02-22

## 2024-02-22 NOTE — TELEPHONE ENCOUNTER
Sangeeta from [facility] called, patient was prescribed Estradiol 1 g Vaginal 2 times weekly, At bedtime back in January for vaginal atrophy.  Patient reported the cream was working at first but now is complaining or worsening vaginal irritation, vaginal itching.  NP on the unit would like to know if they can increase the cream to 3 times weekly HS or is there is another cream that could be prescribed for the patient. Sangeeta can be reached at [phone], central unit.

## 2024-04-05 ENCOUNTER — OFFICE VISIT (OUTPATIENT)
Dept: GYNECOLOGY | Facility: CLINIC | Age: 69
End: 2024-04-05
Payer: MEDICARE

## 2024-04-05 VITALS — BODY MASS INDEX: 31.25 KG/M2 | SYSTOLIC BLOOD PRESSURE: 126 MMHG | DIASTOLIC BLOOD PRESSURE: 74 MMHG | WEIGHT: 165.4 LBS

## 2024-04-05 DIAGNOSIS — M62.89 PELVIC FLOOR DYSFUNCTION: Primary | ICD-10-CM

## 2024-04-05 DIAGNOSIS — N95.2 VAGINAL ATROPHY: ICD-10-CM

## 2024-04-05 PROCEDURE — 99213 OFFICE O/P EST LOW 20 MIN: CPT | Performed by: OBSTETRICS & GYNECOLOGY

## 2024-04-05 RX ORDER — GABAPENTIN 400 MG/1
CAPSULE ORAL
COMMUNITY
Start: 2024-03-27

## 2024-04-05 RX ORDER — FLUVASTATIN 20 MG/1
CAPSULE ORAL
COMMUNITY
Start: 2024-03-27

## 2024-04-05 RX ORDER — LOSARTAN POTASSIUM 100 MG/1
TABLET ORAL
COMMUNITY
Start: 2024-03-27

## 2024-04-05 RX ORDER — BRIMONIDINE TARTRATE 2 MG/ML
1 SOLUTION/ DROPS OPHTHALMIC 2 TIMES DAILY
COMMUNITY
Start: 2024-01-25

## 2024-04-06 NOTE — PROGRESS NOTES
"Assessment/Plan:     Pelvic discomfort-I suspect that her symptoms may be from weakness of the pelvic floor.  Although her levator jerzy weak.  She has no obvious prolapse.  We discussed Kegels and also pelvic floor PT.  She is interested in this.  I recommended that she continue using the vaginal estrogen.  I placed a referral to PT.  I gave her information and explained how to do the Kegel's.  Her exam is essentially normal     Diagnoses and all orders for this visit:    Pelvic floor dysfunction  -     Ambulatory Referral to Physical Therapy; Future    Other orders  -     brimonidine tartrate 0.2 % ophthalmic solution; Apply 1 drop to eye 2 (two) times a day  -     fluvastatin (LESCOL) 20 MG capsule  -     gabapentin (NEURONTIN) 400 mg capsule  -     losartan (COZAAR) 100 MG tablet          Subjective:     Patient ID: Lucía Khan is a 68 y.o. female.    Patient here for follow-up.  She was here 3 months ago and I started her on vaginal estrogen.  She was having some burning but also symptoms that sounded consistent with prolapse although she has no obvious prolapse on exam.  She does not feel that she has much improvement in her symptoms.  She feels like \"something is moving near the vagina and rectum\"        Review of Systems   Constitutional: Negative.    Gastrointestinal: Negative.    Genitourinary: Negative.         Pelvic discomfort         Objective:     Physical Exam  Genitourinary:     General: Normal vulva.      Vagina: Normal.      Cervix: Normal.      Uterus: Normal.       Adnexa: Right adnexa normal and left adnexa normal.      Comments: Atrophy seems to have improved with the use of the vaginal estrogen  No obvious prolapse   rectovaginal septum is lax, but no obvious rectocele          "

## 2024-06-04 ENCOUNTER — TELEPHONE (OUTPATIENT)
Age: 69
End: 2024-06-04

## 2024-06-04 NOTE — TELEPHONE ENCOUNTER
This RN attempted to phone Unit Manager, Sangeeta at [facility] with update.  This RN phoned [phone] which was the number verified with Sangeeta earlier.  There phone just rang and rang with no voice mail option.  This RN to phone back later.

## 2024-06-04 NOTE — TELEPHONE ENCOUNTER
Message  received from Neil Robles asking for call back to schedule apt for patient.      This RN phoned back to find out more details and spoke with unit manager, Sangeeta [phone].  Sangeeta stated that patient is having numbness in her legs.  This RN asked if they had the patient see a PCP and the response was that the patient sees a PCP every day and they wanted her to follow up with Neurosurgery.  This RN asked if they obtained any imaging and the answer was no. Unit manager informed that we only saw this patient for   REPLACEMENT IMPLANTABLE PULSE GENERATOR DORSAL SPINAL COLUMN STIMULATOR (Left: Flank) on 1/2/2024 with a post op incision check on 1/22/2024.      Being that we have only seen this patient for the above noted sx this RN will route to intake team for consideration and potential scheduling with the correct provider.     This RN gave Sangeeta her direct number for call back should she need additional assistance.  Sangeeta was appreciative for assistance.

## 2024-06-05 NOTE — TELEPHONE ENCOUNTER
This RN phoned Sangeeta at [phone] with no answer and no machine .  This RN then phoned [phone] ans asked to speak with Sangeeta.     This RN was connected with Sangeeta and inquired if facility had yet contacted the stimulator rep and her response was no.  This RN suggested that feedback was that they do so first as that may be the issue and then if need be phone back to scheduled appointment with and AP.  Sangeeta stated that she will share that information with the PCP team at [facility] and reach back out accordingly.  Sangeeta, was appreciative for the assistance.

## 2024-09-18 ENCOUNTER — DOCUMENTATION (OUTPATIENT)
Age: 69
End: 2024-09-18

## 2024-09-18 NOTE — PROGRESS NOTES
Neurosurgery Progress Note    Pt Name: Lucía Khan  Pt MRN: [id number]    HPI: 68-year-old female with a history of posterior lumbar decompression surgery many years ago and postlaminectomy syndrome treated with a spinal cord stimulator that was placed around 2012 and a subsequent morphine intrathecal pain pump placed around 2015. She is s/p left flank battery replacement on 1/2/24. Pt called due to lost to followup. She is doing well. Incision healed normally. No concerns for infection. Stimulator working appropriately. Pain adequately controlled. No current concerns.    PE: Not performed, visit completed over the phone.    A/P: 68-year-old female with a history of posterior lumbar decompression surgery many years ago and postlaminectomy syndrome treated with a spinal cord stimulator that was placed around 2012 and a subsequent morphine intrathecal pain pump placed around 2015. She is s/p left flank battery replacement on 1/2/24. Pt doing well postop with no issues. Incision healed well. Pain controlled with current stimulator settings. Can followup as needed.

## 2024-10-02 NOTE — PROGRESS NOTES
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  RX given   , this is overdue and I encouraged her to schedule  Discussed self breast exams    colon cancer screening-colonoscopy in , patient states that she has been doing yearly Hemoccult testing although I do not see it in the chart.  It may be done at the facility where she lives.    Osteoporosis-I explained that this needs to be treated.  I ordered labs that we would typically get prior to starting treatment.  She will have these done and then we can most likely start an oral bisphosphonate such as Fosamax.  I did order a DEXA for January as she will be due again.    discussed preventive care, regular exercise and a healthy diet    I wrote a list of the above recommendations on her sheet that goes back to Neil Robles.  I stressed the importance of her meeting with her psychiatrist to make sure she is on the correct medication and doses.    History of Present Illness     Lucía Khan is a 69 y.o. female who presents for yearly. She was seen in April for discomfort felt to be from pelvic floor dysfunction. I recommended vaginal estrogen but she did not feel this was helping and stopped it.  She is not having any vaginal symptoms at this time.     She  feels like she had 'needle' pains in her rectum and her head and other parts of her body.  She feels this is from a drastic change in her psychiatric medications.  She would like this to be adjusted as she is not feeling well.  When she first moved to [facility], they felt that she was too sedated and stopped her medications but now she is having issues.  She has seen the nurse practitioner at [facility] but has not been to see her psychiatrist.    DEXA from 1-2023 with osteoporosis in her hip and femoral neck.  This has not been treated.  Normal 3d mammogram in April 2022 with scattered fibroglandular densities      Review of Systems   Constitutional: Negative.         Needle type pains throughout her body, mainly head and rectum   Gastrointestinal: Negative.    Genitourinary: Negative.            Objective     There were no vitals taken for this visit.    Physical Exam  Vitals and nursing note reviewed. Exam conducted with a chaperone present.   Constitutional:       Appearance: She is well-developed.   HENT:      Head: Normocephalic and atraumatic.   Neck:      Thyroid: No thyromegaly.   Cardiovascular:      Rate and Rhythm: Normal rate and regular rhythm.   Pulmonary:      Effort: Pulmonary effort is normal.      Breath sounds: Normal breath sounds.   Chest:   Breasts:     Right: Normal.      Left: Normal.      Comments: Examined seated and supine  Abdominal:      Palpations: Abdomen is soft.   Genitourinary:     General: Normal vulva.      Vagina: Normal.      Cervix: Normal.      Uterus: Normal.       Adnexa: Right adnexa normal and left adnexa normal.      Rectum: Normal.      Comments: Vaginal tissue appears normal, only mild atrophy noted  Normal exam, no pain  Musculoskeletal:      Cervical back: Neck supple.   Skin:     General: Skin is warm and dry.   Neurological:      Mental Status: She is alert.   Psychiatric:         Mood and Affect: Mood normal.

## 2024-10-03 ENCOUNTER — ANNUAL EXAM (OUTPATIENT)
Dept: GYNECOLOGY | Facility: CLINIC | Age: 69
End: 2024-10-03
Payer: MEDICARE

## 2024-10-03 VITALS — SYSTOLIC BLOOD PRESSURE: 146 MMHG | DIASTOLIC BLOOD PRESSURE: 80 MMHG | BODY MASS INDEX: 31.74 KG/M2 | WEIGHT: 168 LBS

## 2024-10-03 DIAGNOSIS — M81.0 AGE RELATED OSTEOPOROSIS, UNSPECIFIED PATHOLOGICAL FRACTURE PRESENCE: ICD-10-CM

## 2024-10-03 DIAGNOSIS — Z01.419 ENCOUNTER FOR ANNUAL ROUTINE GYNECOLOGICAL EXAMINATION: Primary | ICD-10-CM

## 2024-10-03 DIAGNOSIS — M81.0 OSTEOPOROSIS OF FEMUR WITHOUT PATHOLOGICAL FRACTURE: ICD-10-CM

## 2024-10-03 DIAGNOSIS — Z12.31 ENCOUNTER FOR SCREENING MAMMOGRAM FOR BREAST CANCER: ICD-10-CM

## 2024-10-03 PROCEDURE — G0101 CA SCREEN;PELVIC/BREAST EXAM: HCPCS | Performed by: OBSTETRICS & GYNECOLOGY

## 2024-10-21 ENCOUNTER — HOSPITAL ENCOUNTER (OUTPATIENT)
Dept: RADIOLOGY | Age: 69
Discharge: HOME | End: 2024-10-21
Payer: MEDICARE

## 2024-10-21 VITALS — BODY MASS INDEX: 31.72 KG/M2 | HEIGHT: 61 IN | WEIGHT: 168 LBS

## 2024-10-21 DIAGNOSIS — Z12.31 ENCOUNTER FOR SCREENING MAMMOGRAM FOR BREAST CANCER: ICD-10-CM

## 2024-10-21 PROCEDURE — 77067 SCR MAMMO BI INCL CAD: CPT

## 2024-10-21 PROCEDURE — 77063 BREAST TOMOSYNTHESIS BI: CPT

## 2025-01-10 ENCOUNTER — HOSPITAL ENCOUNTER (OUTPATIENT)
Dept: BONE DENSITY | Facility: MEDICAL CENTER | Age: 70
Discharge: HOME | End: 2025-01-10
Payer: MEDICARE

## 2025-01-10 DIAGNOSIS — M81.0 AGE RELATED OSTEOPOROSIS, UNSPECIFIED PATHOLOGICAL FRACTURE PRESENCE: ICD-10-CM

## 2025-01-10 PROCEDURE — 77080 DXA BONE DENSITY AXIAL: CPT

## 2025-01-21 DIAGNOSIS — M81.0 OSTEOPOROSIS OF FEMUR WITHOUT PATHOLOGICAL FRACTURE: Primary | ICD-10-CM

## (undated) DEVICE — GAUZE SPONGES,16 PLY: Brand: CURITY

## (undated) DEVICE — BETHLEHEM UNIVERSAL MINOR GEN: Brand: CARDINAL HEALTH

## (undated) DEVICE — SUT PROLENE 2-0 FS 18 IN 8685H

## (undated) DEVICE — Device

## (undated) DEVICE — CHLORAPREP HI-LITE 26ML ORANGE

## (undated) DEVICE — DRESSING MEPILEX AG BORDER 4 X 4 IN

## (undated) DEVICE — 3M™ IOBAN™ 2 ANTIMICROBIAL INCISE DRAPE 6640EZ: Brand: IOBAN™ 2

## (undated) DEVICE — GLOVE INDICATOR PI UNDERGLOVE SZ 7.5 BLUE

## (undated) DEVICE — PAD GROUNDING ADULT

## (undated) DEVICE — GLOVE SRG BIOGEL 7.5

## (undated) DEVICE — SKIN MARKER DUAL TIP WITH RULER CAP, FLEXIBLE RULER AND LABELS: Brand: DEVON

## (undated) DEVICE — TIBURON SPLIT SHEET: Brand: CONVERTORS

## (undated) DEVICE — ADHESIVE SKIN HIGH VISCOSITY EXOFIN 1ML

## (undated) DEVICE — MEDI-VAC YANKAUER SUCTION HANDLE W/STRAIGHT TIP & CONTROL VENT: Brand: CARDINAL HEALTH

## (undated) DEVICE — SUT SILK 2-0 SH 30 IN K833H

## (undated) DEVICE — TUBING SUCTION 5MM X 12 FT

## (undated) DEVICE — ELECTRODE BLADE MOD E-Z CLEAN 2.5IN 6.4CM -0012M

## (undated) DEVICE — ANTIBACTERIAL VIOLET BRAIDED (POLYGLACTIN 910), SYNTHETIC ABSORBABLE SUTURE: Brand: COATED VICRYL

## (undated) DEVICE — NEPTUNE E-SEP SMOKE EVACUATION PENCIL, COATED, 70MM BLADE, PUSH BUTTON SWITCH: Brand: NEPTUNE E-SEP

## (undated) DEVICE — SUT STRATAFIX SPIRAL MONOCRYL PLUS 4-0 PS-2 45CM SXMP1B118

## (undated) DEVICE — NEEDLE 25G X 1 1/2